# Patient Record
Sex: MALE | Race: WHITE | NOT HISPANIC OR LATINO | URBAN - METROPOLITAN AREA
[De-identification: names, ages, dates, MRNs, and addresses within clinical notes are randomized per-mention and may not be internally consistent; named-entity substitution may affect disease eponyms.]

---

## 2021-01-13 ENCOUNTER — EMERGENCY (EMERGENCY)
Facility: HOSPITAL | Age: 36
LOS: 1 days | Discharge: HOME | End: 2021-01-13
Attending: EMERGENCY MEDICINE
Payer: COMMERCIAL

## 2021-01-13 VITALS
DIASTOLIC BLOOD PRESSURE: 80 MMHG | TEMPERATURE: 98 F | SYSTOLIC BLOOD PRESSURE: 129 MMHG | OXYGEN SATURATION: 98 % | RESPIRATION RATE: 16 BRPM | WEIGHT: 190.92 LBS | HEART RATE: 74 BPM

## 2021-01-13 LAB — SARS-COV-2 RNA SPEC QL NAA+PROBE: SIGNIFICANT CHANGE UP

## 2021-01-13 PROCEDURE — 99283 EMERGENCY DEPT VISIT LOW MDM: CPT

## 2021-01-13 PROCEDURE — U0005: CPT

## 2021-01-13 PROCEDURE — 87635 SARS-COV-2 COVID-19 AMP PRB: CPT

## 2021-01-13 NOTE — ED PROVIDER NOTE - PATIENT PORTAL LINK FT
You can access the FollowMyHealth Patient Portal offered by [facility] by registering at the following website: [email or web address]. By joining [employer]’s FollowMyHealth portal, you will also be able to view your health information using other applications (apps) compatible with our system.

## 2021-01-13 NOTE — ED PROVIDER NOTE - OBJECTIVE STATEMENT
Patient presents for testing for COVID-19.  Patient denies fevers, chills, cough, shortness of breath, chest pain or any other signs or symptoms of concern. States father tested positive.

## 2021-01-13 NOTE — ED PROVIDER NOTE - ATTENDING CONTRIBUTION TO CARE
34 yo M here for covid testing. Asymptomatic. Dad tested positive. Pt is nontoxic appearing, not hypoxic, no respiratory distress, not hypotensive. Pt instructed on self quarantine and monitoring. Return precautions explained.    Dr. ARELY Rice:  I have independently evaluated the patient and have documented in the appropriate sections above.  I agree with the exam and plan as noted above.

## 2021-10-26 PROBLEM — Z00.00 ENCOUNTER FOR PREVENTIVE HEALTH EXAMINATION: Status: ACTIVE | Noted: 2021-10-26

## 2022-12-16 ENCOUNTER — EMERGENCY (EMERGENCY)
Facility: HOSPITAL | Age: 37
LOS: 1 days | Discharge: HOME | End: 2022-12-16
Attending: STUDENT IN AN ORGANIZED HEALTH CARE EDUCATION/TRAINING PROGRAM
Payer: COMMERCIAL

## 2022-12-16 VITALS
RESPIRATION RATE: 18 BRPM | DIASTOLIC BLOOD PRESSURE: 61 MMHG | TEMPERATURE: 98 F | WEIGHT: 199.96 LBS | SYSTOLIC BLOOD PRESSURE: 100 MMHG | OXYGEN SATURATION: 95 % | HEART RATE: 95 BPM | HEIGHT: 69 IN

## 2022-12-16 PROCEDURE — 99283 EMERGENCY DEPT VISIT LOW MDM: CPT

## 2022-12-16 NOTE — ED ADULT TRIAGE NOTE - CHIEF COMPLAINT QUOTE
pt stated he was standing on the loft in his [employer] and there is a wall next to the loft and he  climbed over it and he landed on the table and fell off the table onto his right knee, this occurred around 6pm-6;15 today, c/o pain to right knee.

## 2022-12-17 PROBLEM — Z78.9 OTHER SPECIFIED HEALTH STATUS: Chronic | Status: ACTIVE | Noted: 2021-01-13

## 2022-12-17 PROCEDURE — 99283 EMERGENCY DEPT VISIT LOW MDM: CPT

## 2022-12-17 PROCEDURE — 73562 X-RAY EXAM OF KNEE 3: CPT

## 2022-12-17 PROCEDURE — 73562 X-RAY EXAM OF KNEE 3: CPT | Mod: 26,RT

## 2022-12-17 RX ORDER — ACETAMINOPHEN 500 MG
650 TABLET ORAL ONCE
Refills: 0 | Status: COMPLETED | OUTPATIENT
Start: 2022-12-17 | End: 2022-12-17

## 2022-12-17 RX ADMIN — Medication 650 MILLIGRAM(S): at 00:35

## 2022-12-17 NOTE — ED PROVIDER NOTE - CLINICAL SUMMARY MEDICAL DECISION MAKING FREE TEXT BOX
Blood work and COVID-19 test right now. Hypertension    Extensive counseling done to keep low sodium diet and  Avoid potato chips, pretzels, sauerkraut , ham , sausage, campbell , salty crackers , salty french fries, salty nuts, salty popcorn etc.  Use only low sodium soups. No salted canned vegetables. Use fresh or frozen vegetables. No salt shaker use. May use Mrs. Escalante as a salt substitute. Avoid weight gain. Regular exercise program.  Keep taking blood pressure medications regularly. If blood pressure staying above 130/85 or having side effects with blood pressure medications, then bring the blood pressure and pulse recorded twice a day to an appointment sooner than scheduled one. Metoprolol 25 mg a day    Strict GERD diet and keep esomeprazole same and stop diclofenac for 1 week     extensive counseling done to keep avoiding spicy, acidic tomato based foods or fatty foods like chocolate, citrus fruits (oranges, grapefruit etc.) and fruit juices. Limit intake of coffee, tea, alcohol and Nba to one a day after food only. Watch your weight. Being overweight increases intra-abdominal pressure - which can aggravate heartburn or reflux. Don't gorge yourself at meal time. Eat smaller meals. Wait for 2 hours for exercise after eating. No eating or drinking (not even water) for 3-4 hours before lying down. May elevate head of bed with blocks , if needed. Loratadine 10 mg a day and vitamin C 1000 mg a day and Flonase over-the-counter nose spray twice a day each nostril as needed. Discussed use, benefit, and side effects of prescribed medications. Barriers to compliance discussed. All patient questions answered. Pt voiced understanding. IF YOU NEED A PRESCRIPTION REFILL, THEN PLEASE GIVE US THREE WORKING DAYS TO REFILL A PRESCRIPTION. May go to urgent care or Emergency room or call to be seen in the office sooner than scheduled follow-up appointmen,if condition worsens.      Follow-up with  [facility] in 1 to 2 weeks Pt p/w traumatic right knee pain. X-rays pending. Patient stable and will reassess. Pt p/w traumatic right knee pain. X-rays pending. Patient stable and will reassess.    XR neg. On reassessment pt is continues to bear weight on both knees though some concern for instability of the R knee. Placed in knee immobilizer, given crutches, and rec ortho f/u ASAP. Most likely ligamental or meniscal injury vs other non emergent etiology of symptoms- the details of the case, history, and exam make more emergent diagnoses much less likely. Discussed with pt my clinical impression and results, patient given strict return precautions if persistent or worsening of symptoms occurs, and need for close follow up. Pt expressed understanding and agrees with plan. Pt is well appearing with a reassuring exam. Discharge home with PMD or Specialist f/u within 5 days.

## 2022-12-17 NOTE — ED PROVIDER NOTE - NSFOLLOWUPCLINICS_GEN_ALL_ED_FT
[facility]  Orthopedics  [address] [place] [address]  Phone: [phone]  Fax: [phone]  Follow Up Time: 1-3 Days

## 2022-12-17 NOTE — ED ADULT NURSE NOTE - CHIEF COMPLAINT QUOTE
pt stated he was standing on the loft in his [employer] and there is a wall next to the loft and he  climbed over it and he landed on the table and fell off the table onto his right knee, this occurred around 6pm-6;15 today, c/o pain to right knee.

## 2022-12-17 NOTE — ED ADULT NURSE NOTE - DISCHARGE DATE/TIME
17-Dec-2022 03:11
Class I (easy) - visualization of the soft palate, fauces, uvula, and both anterior and posterior pillars

## 2022-12-17 NOTE — ED PROVIDER NOTE - PATIENT PORTAL LINK FT
You can access the FollowMyHealth Patient Portal offered by [facility] by registering at the following website: [email or web address]. By joining [employer]’s FollowMyHealth portal, you will also be able to view your health information using other applications (apps) compatible with our system.

## 2022-12-17 NOTE — ED PROVIDER NOTE - OBJECTIVE STATEMENT
36 y/o male with no significant PMHx, no daily meds, p/w right knee pain after fall from approx 3 feet. Pt states he has been ambulating since fall but continued pain in right knee. Pt denies hitting head, LOC, other pain/injuries chest pain, shortness of breath, syncope, or any other recent illnesses and hospitalizations.

## 2022-12-17 NOTE — ED PROVIDER NOTE - NSFOLLOWUPINSTRUCTIONS_ED_ALL_ED_FT
You were seen in the emergency room today for knee pain. Please see the Orthopedic doctors at the next available appointment. Please call your primary doctor to inform them of this ER visit and obtain the next available appointment within the next 5 days. As we discussed, return to the ER if you have any worsening symptoms.    We no longer feel that you need further emergency care or admission to the hospital at this time.    While we have determined that you are currently stable for discharge, we know that things can change. Please seek immediate medical attention or return to the ER if you experience any of the following:  Any worsening or persistent symptoms  Severe Pain  Chest Pain  Difficulty Breathing  Bleeding  Passing Out  Severe Rash  Inability to Eat or Drink  Persistent Fever    Please see a primary care doctor or specialist within 5 days to ensure that you are improving.    Please call the [facility] phone numbers on this document if you have any problems obtaining a follow up appointment.    I wish you well! -Dr Irwin

## 2022-12-17 NOTE — ED PROVIDER NOTE - PHYSICAL EXAMINATION
Vital Signs Reviewed  GEN: Comfortable, NAD, AAOx3  HEENT: NCAT, MMM, Neck Supple  RESP: CTAB, No rales/rhonchi/wheezing  CV: RRR, S1S2, No murmurs  ABD: No TTP, Soft, ND, No masses, No CVA Tenderness  Extrem/Skin: right knee TTP without obvious deformity or instability, antalgic gait. Equal pulses bilat, No cyanosis/edema/rashes  Neuro: No focal deficits